# Patient Record
Sex: MALE | Race: WHITE | URBAN - METROPOLITAN AREA
[De-identification: names, ages, dates, MRNs, and addresses within clinical notes are randomized per-mention and may not be internally consistent; named-entity substitution may affect disease eponyms.]

---

## 2020-04-07 ENCOUNTER — VIRTUAL VISIT (OUTPATIENT)
Dept: FAMILY MEDICINE | Facility: CLINIC | Age: 40
End: 2020-04-07
Payer: COMMERCIAL

## 2020-04-07 DIAGNOSIS — F10.21 ALCOHOL DEPENDENCE IN REMISSION (H): ICD-10-CM

## 2020-04-07 DIAGNOSIS — F33.41 RECURRENT MAJOR DEPRESSIVE DISORDER, IN PARTIAL REMISSION (H): Primary | ICD-10-CM

## 2020-04-07 DIAGNOSIS — R45.4 ANGER: ICD-10-CM

## 2020-04-07 DIAGNOSIS — F12.10 CANNABIS ABUSE: ICD-10-CM

## 2020-04-07 RX ORDER — ESCITALOPRAM OXALATE 20 MG/1
20 TABLET ORAL DAILY
COMMUNITY
End: 2020-04-07

## 2020-04-07 RX ORDER — ESCITALOPRAM OXALATE 20 MG/1
20 TABLET ORAL DAILY
Qty: 90 TABLET | Refills: 0 | Status: SHIPPED | OUTPATIENT
Start: 2020-04-07 | End: 2020-07-02

## 2020-04-07 ASSESSMENT — ANXIETY QUESTIONNAIRES
2. NOT BEING ABLE TO STOP OR CONTROL WORRYING: NEARLY EVERY DAY
5. BEING SO RESTLESS THAT IT IS HARD TO SIT STILL: NEARLY EVERY DAY
GAD7 TOTAL SCORE: 16
IF YOU CHECKED OFF ANY PROBLEMS ON THIS QUESTIONNAIRE, HOW DIFFICULT HAVE THESE PROBLEMS MADE IT FOR YOU TO DO YOUR WORK, TAKE CARE OF THINGS AT HOME, OR GET ALONG WITH OTHER PEOPLE: VERY DIFFICULT
6. BECOMING EASILY ANNOYED OR IRRITABLE: NEARLY EVERY DAY
7. FEELING AFRAID AS IF SOMETHING AWFUL MIGHT HAPPEN: NOT AT ALL
1. FEELING NERVOUS, ANXIOUS, OR ON EDGE: NEARLY EVERY DAY
3. WORRYING TOO MUCH ABOUT DIFFERENT THINGS: NEARLY EVERY DAY

## 2020-04-07 ASSESSMENT — PATIENT HEALTH QUESTIONNAIRE - PHQ9
5. POOR APPETITE OR OVEREATING: SEVERAL DAYS
SUM OF ALL RESPONSES TO PHQ QUESTIONS 1-9: 24

## 2020-04-07 NOTE — PATIENT INSTRUCTIONS
1. Recurrent major depressive disorder  2. Anger    Continue lexapro 20mg daily (90 day supply sent to pharmacy)  Connect with Behavioral Health services - expect a call    In 3 months, hope to meet face-to-face to refill lexapro  Decide if psychiatrist referral worthwhile for med management       Julita

## 2020-04-07 NOTE — PROGRESS NOTES
"Family Medicine Telephone Visit Note               Telephone Visit Consent   Patient was verbally read the following and verbal consent was obtained.  \"I understand that I may revoke this request for a phone visit at any time.  This consent will automatically  3 months from the signed date and time.\"    Name person giving consent:  Patient   Date verbal consent given:  2020  Time verbal consent given:  4:12 PM       Patient gave me consent to E-sign [facility] everywhere forms to receive his records.     Chief Complaint   Patient presents with     [facility]     recently moved here from [place]. Looking for a PCP. Has been on antidepressants for 1 yr. Unstable when not on medication. Has manic episodes per pt. Worries about a lot of different stressors. Used marijuana in [place] as a coping mechnisim.      Refill Request     Lexapro 20mg      Mental Health Problem     interested in seeing  and a psychiatrist as he did see both mental health providers in [place].               HPI   Patients name: Delmer  Appointment start time:  4:45 PM          1. Med refill, Establish care  Lexapro 20mg     recently moved here from [place]. Looking for a PCP. Has been on antidepressants for 1 yr. Unstable when not on medication. Has \"manic episodes\" per pt. Worries about a lot of different stressors. Used marijuana in [place] as a coping mechnisim.     interested in seeing  and a psychiatrist as he did see both mental health providers in [place].       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 Feels safe with self    Anxiety  Can't sit still. Feels like he has to bolt.  Feeling things with marriage    \"Manic episodes\" mean  Irrationally upset  Anger, leaves for " "2-3 days  Doesn't hurt anyone    Chemical dep  H/o rehab, AA, etc  No EtOH  Cannabis - \"quite a bit\" - only thing he abuses now    Went off lexapro twice  Very uncomfortable  March x 5 days - \"brain being electrocuted\"    Therapist  Last time - over a year      Work -  of commercial building  Travels frequently          Current Outpatient Medications   Medication Sig Dispense Refill     escitalopram (LEXAPRO) 20 MG tablet Take 20 mg by mouth daily       PMH  No Known Allergies   Meds - lexapro  Med Prob - none  Surg - none  Fx - none  Mental health - etoh (in 20s)             Review of Systems:     PHQ-9 score:    PHQ 4/7/2020   PHQ-9 Total Score 24   Q9: Thoughts of better off dead/self-harm past 2 weeks Nearly every day     ADONAY-7 SCORE 4/7/2020   Total Score 16                    Physical Exam:     Virtual visit d/t COVID              Assessment and Plan     1. Recurrent major depressive disorder, in partial remission (H)  2. Anger  Establishing care in [place]. Moved from [place]  Done as phone visit due to COVID pandemic    Diagnoses include depression, anger issues, possible anxiety  Also with significant Chem Dep history - primarily EtOH  Now use cannabis frequently    Continue lexapro 20mg daily  Connect with  services - diagnostic and psychotherapy  HARMEET mailed to patient to sign and send to [place]     In 3 months, hope to meet face-to-face to refill lexapro  Decide if Dr Christine referral worthwhile for med management vs adding wellbutrin.  Hopefully more diagnostic work done by         - BEHAVIORAL HEALTH REFERRAL ([facility] inter[facility] and external)  - escitalopram (LEXAPRO) 20 MG tablet; Take 1 tablet (20 mg) by mouth daily  Dispense: 90 tablet; Refill: 0        3. Cannabis abuse      4. Alcohol dependence in remission (H)  H/o multiple CD programs, AA, etc  No etoh x several years  No other drugs, just cannabis      5. RTC 3 months  lexapro refill  Consider Dr Christine consult vs add " wellbutrin  Check on  attendance        Refilled medications that would be required in the next 3 months.     After Visit Information:  Will print and mail AVS           Appointment start time:  4:45 PM  Appointment end time: 5:07 PM  This is a telephone visit that took 22 minutes.      Clinician location:  Jalil Winn MD

## 2020-04-08 ENCOUNTER — TELEPHONE (OUTPATIENT)
Dept: PSYCHOLOGY | Facility: CLINIC | Age: 40
End: 2020-04-08

## 2020-04-08 ASSESSMENT — ANXIETY QUESTIONNAIRES: GAD7 TOTAL SCORE: 16

## 2020-04-08 NOTE — TELEPHONE ENCOUNTER
Psychiatry and  (therapy) referral.  It would be ideal if the patient could receive both services in one place.  We could send the referral to [facility] psychiatry as they have openings right now, but therapy would not be available.     One place that has both therapy and psychiatrists would be [facility]. They have multiple locations, but there is one in [place], so not too far from Delmer's home.  They are asking that new psychiatry visits take place in person if he is comfortable with that.  The therapy intake would be via telehealth.      [address] [place] [address] [address]  [place] [address]  [phone]    If he prefers to do psychiatry at the , please let me know and we can put that referral through to them.  Otherwise, if he is OK with [facility] he would just need to call them or go to their website to request an appointment.      Thank you!

## 2020-07-01 DIAGNOSIS — F33.41 RECURRENT MAJOR DEPRESSIVE DISORDER, IN PARTIAL REMISSION (H): ICD-10-CM

## 2020-07-01 NOTE — TELEPHONE ENCOUNTER

## 2020-07-01 NOTE — LETTER
Delmer Woodruff  [address] [address]    July 2, 2020        Dear Delmer    I refilled your medication today for one month.  It is time for an appointment.   Please call [phone] to schedule an appointment soon.      Sincerely    PHarper

## 2020-07-02 RX ORDER — ESCITALOPRAM OXALATE 20 MG/1
20 TABLET ORAL DAILY
Qty: 30 TABLET | Refills: 0 | Status: SHIPPED | OUTPATIENT
Start: 2020-07-02